# Patient Record
Sex: MALE | Race: WHITE | NOT HISPANIC OR LATINO | Employment: FULL TIME | URBAN - METROPOLITAN AREA
[De-identification: names, ages, dates, MRNs, and addresses within clinical notes are randomized per-mention and may not be internally consistent; named-entity substitution may affect disease eponyms.]

---

## 2022-05-17 ENCOUNTER — TELEPHONE (OUTPATIENT)
Dept: SURGERY | Facility: CLINIC | Age: 62
End: 2022-05-17
Payer: COMMERCIAL

## 2022-05-17 DIAGNOSIS — C20 RECTAL ADENOCARCINOMA: Primary | ICD-10-CM

## 2022-05-17 NOTE — TELEPHONE ENCOUNTER
Spoke with pt and wife. Colonoscopy done yesterday 5/16/22.  Pt has copy of colored photos.  Dr Prince ([facility]) referring to Dr Chavez for rectal mass.  Pathology report expected next week. They have yet to schedule labs (cbc, cmp, cea) and CT c-a-p.  Explained to pt that we may be needing MRI-rectal protocol and CD imaging.   Pt prefers to see Dr Chavez in [place] but ok to go to [facility] [place].  They will be requesting consult with Dr Delong.    Await callback from Dr Prince' office to discuss clinical data needed.    ----- Message -----  From: Brigitte Alvarez  Sent: 5/16/2022   2:36 PM CDT  To: Scott Rangel Staff    Type:Needs Medical Advice    Who Called:Norma (Wife)  Best call back number:PT number: [phone]  Wifes number: [phone]  Additional Info: Requesting a call back regarding#PT referred by Dr Prince. Mass was found on colonoscopy. Please call to schedule.  Please Advise- Thank you

## 2022-06-14 ENCOUNTER — HOSPITAL ENCOUNTER (OUTPATIENT)
Dept: RADIOLOGY | Facility: HOSPITAL | Age: 62
Discharge: HOME | End: 2022-06-14
Attending: COLON & RECTAL SURGERY
Payer: COMMERCIAL

## 2022-06-14 DIAGNOSIS — C20 RECTAL ADENOCARCINOMA: ICD-10-CM

## 2022-06-14 PROCEDURE — 72197 MRI PELVIS W/O & W/DYE: CPT | Mod: 26,,, | Performed by: RADIOLOGY

## 2022-06-14 PROCEDURE — A9585 GADOBUTROL INJECTION: HCPCS | Mod: PO | Performed by: COLON & RECTAL SURGERY

## 2022-06-14 PROCEDURE — 72197 MRI PELVIS W/O & W/DYE: CPT | Mod: TC,PO

## 2022-06-14 PROCEDURE — 25500020 PHARM REV CODE 255: Mod: PO | Performed by: COLON & RECTAL SURGERY

## 2022-06-14 PROCEDURE — 72197 MRI RECTAL CANCER W W/O CONTRAST: ICD-10-PCS | Mod: 26,,, | Performed by: RADIOLOGY

## 2022-06-14 RX ORDER — GADOBUTROL 604.72 MG/ML
8 INJECTION INTRAVENOUS
Status: COMPLETED | OUTPATIENT
Start: 2022-06-14 | End: 2022-06-14

## 2022-06-14 RX ADMIN — GADOBUTROL 8 ML: 604.72 INJECTION INTRAVENOUS at 04:06

## 2022-06-15 ENCOUNTER — OFFICE VISIT (OUTPATIENT)
Dept: SURGERY | Facility: CLINIC | Age: 62
End: 2022-06-15
Payer: COMMERCIAL

## 2022-06-15 VITALS
BODY MASS INDEX: 24.68 KG/M2 | WEIGHT: 172.38 LBS | HEART RATE: 77 BPM | SYSTOLIC BLOOD PRESSURE: 117 MMHG | HEIGHT: 70 IN | DIASTOLIC BLOOD PRESSURE: 56 MMHG

## 2022-06-15 DIAGNOSIS — C20 RECTAL CANCER: Primary | ICD-10-CM

## 2022-06-15 PROCEDURE — 99999 PR PBB SHADOW E&M-EST. PATIENT-LVL III: ICD-10-PCS | Mod: PBBFAC,,, | Performed by: COLON & RECTAL SURGERY

## 2022-06-15 PROCEDURE — 45330 PR SIGMOIDOSCOPY,DIAG2STIC: ICD-10-PCS | Mod: S$GLB,,, | Performed by: COLON & RECTAL SURGERY

## 2022-06-15 PROCEDURE — 3078F PR MOST RECENT DIASTOLIC BLOOD PRESSURE < 80 MM HG: ICD-10-PCS | Mod: CPTII,S$GLB,, | Performed by: COLON & RECTAL SURGERY

## 2022-06-15 PROCEDURE — 3008F PR BODY MASS INDEX (BMI) DOCUMENTED: ICD-10-PCS | Mod: CPTII,S$GLB,, | Performed by: COLON & RECTAL SURGERY

## 2022-06-15 PROCEDURE — 99999 PR PBB SHADOW E&M-EST. PATIENT-LVL III: CPT | Mod: PBBFAC,,, | Performed by: COLON & RECTAL SURGERY

## 2022-06-15 PROCEDURE — 3008F BODY MASS INDEX DOCD: CPT | Mod: CPTII,S$GLB,, | Performed by: COLON & RECTAL SURGERY

## 2022-06-15 PROCEDURE — 1159F MED LIST DOCD IN RCRD: CPT | Mod: CPTII,S$GLB,, | Performed by: COLON & RECTAL SURGERY

## 2022-06-15 PROCEDURE — 3078F DIAST BP <80 MM HG: CPT | Mod: CPTII,S$GLB,, | Performed by: COLON & RECTAL SURGERY

## 2022-06-15 PROCEDURE — 99205 PR OFFICE/OUTPT VISIT, NEW, LEVL V, 60-74 MIN: ICD-10-PCS | Mod: 25,S$GLB,, | Performed by: COLON & RECTAL SURGERY

## 2022-06-15 PROCEDURE — 3074F PR MOST RECENT SYSTOLIC BLOOD PRESSURE < 130 MM HG: ICD-10-PCS | Mod: CPTII,S$GLB,, | Performed by: COLON & RECTAL SURGERY

## 2022-06-15 PROCEDURE — 4010F ACE/ARB THERAPY RXD/TAKEN: CPT | Mod: CPTII,S$GLB,, | Performed by: COLON & RECTAL SURGERY

## 2022-06-15 PROCEDURE — 99205 OFFICE O/P NEW HI 60 MIN: CPT | Mod: 25,S$GLB,, | Performed by: COLON & RECTAL SURGERY

## 2022-06-15 PROCEDURE — 4010F PR ACE/ARB THEARPY RXD/TAKEN: ICD-10-PCS | Mod: CPTII,S$GLB,, | Performed by: COLON & RECTAL SURGERY

## 2022-06-15 PROCEDURE — 3074F SYST BP LT 130 MM HG: CPT | Mod: CPTII,S$GLB,, | Performed by: COLON & RECTAL SURGERY

## 2022-06-15 PROCEDURE — 1159F PR MEDICATION LIST DOCUMENTED IN MEDICAL RECORD: ICD-10-PCS | Mod: CPTII,S$GLB,, | Performed by: COLON & RECTAL SURGERY

## 2022-06-15 PROCEDURE — 45330 DIAGNOSTIC SIGMOIDOSCOPY: CPT | Mod: S$GLB,,, | Performed by: COLON & RECTAL SURGERY

## 2022-06-15 RX ORDER — SAW PALMETTO 160 MG
160 CAPSULE ORAL 2 TIMES DAILY
COMMUNITY

## 2022-06-15 RX ORDER — TADALAFIL 5 MG/1
5 TABLET ORAL NIGHTLY
COMMUNITY
Start: 2022-05-26

## 2022-06-15 RX ORDER — LEVOFLOXACIN 500 MG/1
750 TABLET, FILM COATED ORAL DAILY
Qty: 14 TABLET | Refills: 1 | Status: SHIPPED | OUTPATIENT
Start: 2022-06-15 | End: 2022-06-15 | Stop reason: CLARIF

## 2022-06-15 RX ORDER — PRAMIPEXOLE DIHYDROCHLORIDE 0.25 MG/1
0.25 TABLET ORAL NIGHTLY
COMMUNITY
Start: 2022-05-23

## 2022-06-15 RX ORDER — LISINOPRIL 10 MG/1
10 TABLET ORAL 2 TIMES DAILY
COMMUNITY
Start: 2022-05-23

## 2022-06-15 RX ORDER — MULTIVITAMIN
1 TABLET ORAL DAILY
COMMUNITY

## 2022-06-15 RX ORDER — AA/PROT/LYSINE/METHIO/VIT C/B6 50-12.5 MG
10 TABLET ORAL DAILY
COMMUNITY
End: 2022-09-07 | Stop reason: CLARIF

## 2022-06-15 RX ORDER — PRASTERONE (DHEA) 50 MG
50 CAPSULE ORAL DAILY
COMMUNITY

## 2022-06-15 RX ORDER — PNV NO.95/FERROUS FUM/FOLIC AC 28MG-0.8MG
100 TABLET ORAL DAILY
COMMUNITY

## 2022-06-15 NOTE — PROGRESS NOTES
CRS Office Visit History and Physical    Referring Md:   S[facility] Prince [facility]  [address] [place]  [place] [address]    SUBJECTIVE:     Chief Complaint: rectal CA    History of Present Illness:  Patient is a 61 y.o. male presents with rectal bleeding.     Last Colonoscopy:         MRI RECTAL CANCER W W/O CONTRAST     CLINICAL HISTORY:  Rectal cancer;  Malignant neoplasm of rectum     TECHNIQUE:  Multiplanar multi sequence images of the pelvis were obtained per rectal tumor protocol.     COMPARISON:  05/30/2022     FINDINGS:  COLONOSCOPY FINDINGS: No results available per chart review     MRI FINDINGS:     Tumor Location: Lower (0-5 cm), mid (5-10 cm)     Tumor location (distance from anal verge): 3.5 cm     Distance from top of anal canal (level of puborectalis at anorectal junction) to the inferior edge of the tumor: Tumor extends below the anorectal junction     Relationship to anterior peritoneal reflection: Straddles (Above and Below)     Tumor Characteristics:     Morphology: Annular     Circumferential extent/location: Tumor is centered in the mid rectum.  Lesion is circumferential and there is between partial to full-thickness involvement of the wall throughout the length.     Tumor Length: 6.7 cm     Mucinous: No     *MR-T CATEGORY: T3c (tumor penetrates > 5-15 mm beyond muscularis propria)     Maximum extramural depth of invasion can be seen series 4, image 15, measuring 6 mm.  There are spiculations extending into the mesorectal fat in multiple other locations.     LOW RECTAL TUMORS: The lower extent of the tumor is 1.5 cm below the upper border of the puborectalis sling.     If tumor is AT or BELOW the upper border of the puborectalis sling: The most penetrating component of the tumor invades: internal sphincter only.     *MESORECTAL FASCIA (MRF):     The distance of the most penetrating component of tumor to the mesorectal fascia is 15 mm on series 4, image 15.     *LYMPH  NODES:     Mesorectal/superior rectal lymph nodes): There are multiple prominent lymph nodes in the mesorectal fat, lower pericolonic fat, and adjacent to the left internal iliac artery, at least 20 in number.  At least 8 of these measure larger than 5 mm in short axis diameter.  A few reference nodes can be seen on axial series 4, image 6 (8 mm), axial series 4, image 10 (8 mm), and axial series 4, image 20 (9 mm).  The 9 mm node exhibits irregular borders.     *MR-N CATEGORY: Nx (all other cases): See above statement     Extra-mesorectal nodes (Int. Iliac/Obturator >= 7 mm): No internal iliac or obturator nodes greater than 7 mm     Non-locoregional nodes (Ext. Iliac/Inguinal > 1cm): No external iliac or inguinal nodes greater than 1 cm     *EXTRAMURAL VENOUS INVASION (EMVI): Negative. Stranding near extramural vessels, however vessels are normal caliber without definite tumor signal within.     Impression:     Rectal tumor consistent with known malignancy.     *MR STAGE     T: T3c (tumor penetrates > 5-15 mm beyond muscularis propria)     N: Nx (all other cases): See description above     Andria statement: Multiple lymph nodes in the mesorectal fat and lower pericolonic fat     *MRF: clear (tumor margin > 2 mm from MRF).  No clear involvement of the mesorectal fascia.  Along the caudal margin of the tumor anteriorly there is abutment of the prostate with complete effacement of the fat plane.     Sphincter Involvement: Tumor extends to the ano rectal junction at its caudal margin     Suspicious Extramesorectal Lymph Nodes: See above description     EMVI: No        Electronically signed by: Jeff Wilburn  Date:                                            06/14/2022  Time:                                           23:23    No past medical history on file.    No past surgical history on file.    Review of patient's allergies indicates:  No Known Allergies    Current Outpatient Medications on File Prior to Visit  "  Medication Sig Dispense Refill    lisinopriL 10 MG tablet Take 10 mg by mouth 2 (two) times daily.      pramipexole (MIRAPEX) 0.25 MG tablet TAKE 1 TABLET BY MOUTH EVERY DAY AT BEDTIME FOR 30 DAYS      tadalafiL (CIALIS) 5 MG tablet TAKE 1 TABLET BY MOUTH EVERY DAY FOR 90 DAYS       Current Facility-Administered Medications on File Prior to Visit   Medication Dose Route Frequency Provider Last Rate Last Admin    [COMPLETED] gadobutroL (GADAVIST) injection 8 mL  8 mL Intravenous ONCE PRN H. Juan Carlos Chavez MD   8 mL at 06/14/22 1606       No family history on file.          Review of Systems:  ROS:  GENERAL: No fever, chills, fatigability or weight loss.  Integument:  No rashes, redness, icterus  CHEST: Denies AQUINO, cyanosis, wheezing, cough and sputum production.  CARDIOVASCULAR: Denies chest pain, PND, orthopnea or reduced exercise tolerance.  GI:  Denies abd pain, dysphagia, nausea, vomiting, no hematemesis, no rectal pain  : Denies burning on urination, no hematuria, no bacteriuria  MSK:  No deformities, swelling, joint pain swelling  Neurologic:  No HAs, seizures, weakness, paresthesias, gait problems      OBJECTIVE:     Vital Signs (Most Recent)  BP (!) 117/56 (BP Location: Left arm, Patient Position: Sitting, BP Method: Large (Automatic))   Pulse 77   Ht 5' 10" (1.778 m)   Wt 78.2 kg (172 lb 6.4 oz)   BMI 24.74 kg/m²     Physical Exam:  General: White male in no distress   Skin/ Sclera anicteric  LN none palpable  HEENT: anicteric, normocephalic, extraocular movements intact   Neck trachea midline, thyroid not enlarged  Chest symmetric, nl excursions, no retractions  Respiratory: respirations are even and unlabored  COR RRR   Abdomen - inspection   soft NT ND.  no masses, no organomegaly    Anorectal:   Inspectio  Extremities: Warm dry and intact.  NO CCE  Neuro: alert and oriented x 4.  Moves all extremities.         ASSESSMENT/PLAN:   Locally invasive rectal cancer, low rectum, T3 N1 by MRI.  MMR " proficient  No distant disease  Good performance status (ECOG 0)    Recommend  See flexible sigmoidoscopy.    Total neoadjuvant therapy, long course XRT followed by 8  cycles of FOLFOX or CAPOX   Restaging MRI and flex sigmoidoscopy following last chemoRX

## 2022-06-15 NOTE — PROVATION PATIENT INSTRUCTIONS
Discharge Summary/Instructions after an Endoscopic Procedure  Patient Name: Hipolito Casillas  Patient MRN: [id number]  Patient YOB: 1960  Wednesday, Bonita 15, 2022  Juan Carlos Chavez MD  Dear patient,  As a result of recent federal legislation (The Federal Cures Act), you may   receive lab or pathology results from your procedure in your [facility]   account before your physician is able to contact you. Your physician or   their representative will relay the results to you with their   recommendations at their soonest availability.  Thank you,  RESTRICTIONS:  During your procedure today, you received medications for sedation.  These   medications may affect your judgment, balance and coordination.  Therefore,   for 24 hours, you have the following restrictions:   - DO NOT drive a car, operate machinery, make legal/financial decisions,   sign important papers or drink alcohol.    ACTIVITY:  Today: no heavy lifting, straining or running due to procedural   sedation/anesthesia.  The following day: return to full activity including work.  DIET:  Eat and drink normally unless instructed otherwise.     TREATMENT FOR COMMON SIDE EFFECTS:  - Mild abdominal pain, nausea, belching, bloating or excessive gas:  rest,   eat lightly and use a heating pad.  - Sore Throat: treat with throat lozenges and/or gargle with warm salt   water.  - Because air was used during the procedure, expelling large amounts of air   from your rectum or belching is normal.  - If a bowel prep was taken, you may not have a bowel movement for 1-3 days.    This is normal.  SYMPTOMS TO WATCH FOR AND REPORT TO YOUR PHYSICIAN:  1. Abdominal pain or bloating, other than gas cramps.  2. Chest pain.  3. Back pain.  4. Signs of infection such as: chills or fever occurring within 24 hours   after the procedure.  5. Rectal bleeding, which would show as bright red, maroon, or black stools.   (A tablespoon of blood from the rectum is not serious, especially if    hemorrhoids are present.)  6. Vomiting.  7. Weakness or dizziness.  GO DIRECTLY TO THE NEAREST EMERGENCY ROOM IF YOU HAVE ANY OF THE FOLLOWING:      Difficulty breathing              Chills and/or fever over 101 F   Persistent vomiting and/or vomiting blood   Severe abdominal pain   Severe chest pain   Black, tarry stools   Bleeding- more than one tablespoon   Any other symptom or condition that you feel may need urgent attention  Your doctor recommends these additional instructions:  If any biopsies were taken, your doctors clinic will contact you in 1 to 2   weeks with any results.  - Discharge patient to home (ambulatory).   - Patient has a contact number available for emergencies.  The signs and   symptoms of potential delayed complications were discussed with the   patient.  Return to normal activities tomorrow.  Written discharge   instructions were provided to the patient.   - Resume previous diet.   - Repeat flexible sigmoidoscopy in 6 months for surveillance.  For questions, problems or results please call your physician - Juan Carlos Chavez MD at Work:  [phone].  [facility], EMERGENCY ROOM PHONE NUMBER: [phone]  IF A COMPLICATION OR EMERGENCY SITUATION ARISES AND YOU ARE UNABLE TO REACH   YOUR PHYSICIAN - GO DIRECTLY TO THE EMERGENCY ROOM.  Juan Carlos Chavez MD  6/15/2022 5:16:56 PM  This report has been verified and signed electronically.  Dear patient,  As a result of recent federal legislation (The Federal Cures Act), you may   receive lab or pathology results from your procedure in your [facility]   account before your physician is able to contact you. Your physician or   their representative will relay the results to you with their   recommendations at their soonest availability.  Thank you,  PROVATION

## 2022-06-20 ENCOUNTER — DOCUMENTATION ONLY (OUTPATIENT)
Dept: ADMINISTRATIVE | Facility: OTHER | Age: 62
End: 2022-06-20
Payer: COMMERCIAL

## 2022-06-21 ENCOUNTER — HOSPITAL ENCOUNTER (OUTPATIENT)
Dept: RADIOLOGY | Facility: HOSPITAL | Age: 62
Discharge: HOME | End: 2022-06-21
Attending: INTERNAL MEDICINE
Payer: COMMERCIAL

## 2022-06-21 DIAGNOSIS — C20 RECTAL CANCER: ICD-10-CM

## 2022-06-21 PROCEDURE — 74183 MRI ABD W/O CNTR FLWD CNTR: CPT | Mod: TC,PO

## 2022-06-21 PROCEDURE — A9577 INJ MULTIHANCE: HCPCS | Mod: PO | Performed by: INTERNAL MEDICINE

## 2022-06-21 PROCEDURE — 74183 MRI ABD W/O CNTR FLWD CNTR: CPT | Mod: 26,,, | Performed by: RADIOLOGY

## 2022-06-21 PROCEDURE — 25500020 PHARM REV CODE 255: Mod: PO | Performed by: INTERNAL MEDICINE

## 2022-06-21 PROCEDURE — 74183 MRI ABDOMEN W WO CONTRAST: ICD-10-PCS | Mod: 26,,, | Performed by: RADIOLOGY

## 2022-06-21 RX ADMIN — GADOBENATE DIMEGLUMINE 17 ML: 529 INJECTION, SOLUTION INTRAVENOUS at 11:06

## 2022-06-27 ENCOUNTER — OFFICE VISIT (OUTPATIENT)
Dept: SURGERY | Facility: CLINIC | Age: 62
End: 2022-06-27
Payer: COMMERCIAL

## 2022-06-27 VITALS
SYSTOLIC BLOOD PRESSURE: 119 MMHG | HEIGHT: 70 IN | WEIGHT: 180.56 LBS | DIASTOLIC BLOOD PRESSURE: 54 MMHG | HEART RATE: 88 BPM | BODY MASS INDEX: 25.85 KG/M2

## 2022-06-27 DIAGNOSIS — C20 RECTAL CANCER: Primary | ICD-10-CM

## 2022-06-27 PROCEDURE — 99999 PR PBB SHADOW E&M-EST. PATIENT-LVL III: CPT | Mod: PBBFAC,,, | Performed by: COLON & RECTAL SURGERY

## 2022-06-27 PROCEDURE — 99214 OFFICE O/P EST MOD 30 MIN: CPT | Mod: S$GLB,,, | Performed by: COLON & RECTAL SURGERY

## 2022-06-27 PROCEDURE — 3074F SYST BP LT 130 MM HG: CPT | Mod: CPTII,S$GLB,, | Performed by: COLON & RECTAL SURGERY

## 2022-06-27 PROCEDURE — 4010F PR ACE/ARB THEARPY RXD/TAKEN: ICD-10-PCS | Mod: CPTII,S$GLB,, | Performed by: COLON & RECTAL SURGERY

## 2022-06-27 PROCEDURE — 3008F BODY MASS INDEX DOCD: CPT | Mod: CPTII,S$GLB,, | Performed by: COLON & RECTAL SURGERY

## 2022-06-27 PROCEDURE — 99999 PR PBB SHADOW E&M-EST. PATIENT-LVL III: ICD-10-PCS | Mod: PBBFAC,,, | Performed by: COLON & RECTAL SURGERY

## 2022-06-27 PROCEDURE — 1159F PR MEDICATION LIST DOCUMENTED IN MEDICAL RECORD: ICD-10-PCS | Mod: CPTII,S$GLB,, | Performed by: COLON & RECTAL SURGERY

## 2022-06-27 PROCEDURE — 99214 PR OFFICE/OUTPT VISIT, EST, LEVL IV, 30-39 MIN: ICD-10-PCS | Mod: S$GLB,,, | Performed by: COLON & RECTAL SURGERY

## 2022-06-27 PROCEDURE — 3078F PR MOST RECENT DIASTOLIC BLOOD PRESSURE < 80 MM HG: ICD-10-PCS | Mod: CPTII,S$GLB,, | Performed by: COLON & RECTAL SURGERY

## 2022-06-27 PROCEDURE — 3008F PR BODY MASS INDEX (BMI) DOCUMENTED: ICD-10-PCS | Mod: CPTII,S$GLB,, | Performed by: COLON & RECTAL SURGERY

## 2022-06-27 PROCEDURE — 3078F DIAST BP <80 MM HG: CPT | Mod: CPTII,S$GLB,, | Performed by: COLON & RECTAL SURGERY

## 2022-06-27 PROCEDURE — 1159F MED LIST DOCD IN RCRD: CPT | Mod: CPTII,S$GLB,, | Performed by: COLON & RECTAL SURGERY

## 2022-06-27 PROCEDURE — 3074F PR MOST RECENT SYSTOLIC BLOOD PRESSURE < 130 MM HG: ICD-10-PCS | Mod: CPTII,S$GLB,, | Performed by: COLON & RECTAL SURGERY

## 2022-06-27 PROCEDURE — 4010F ACE/ARB THERAPY RXD/TAKEN: CPT | Mod: CPTII,S$GLB,, | Performed by: COLON & RECTAL SURGERY

## 2022-06-28 NOTE — PROGRESS NOTES
RADIATION ONCOLOGY CONSULTATION  [facility]        NAME: Hipolito Casillas   : 1960 SEX: CORRINE MR#: [id number]   DIAGNOSIS: Rectal cancer.   REFERRING PHYSICIAN: Ty Delong M.D.   DATE OF CONSULTATION: 2022       IDENTIFICATION:  The patient is a 61-year-old male who presents with a newly diagnosed stage IIIB, [id number] moderately differentiated rectal adenocarcinoma, status post colonoscopy and biopsy.  The patient is being seen in consultation for consideration of radiotherapy at the request of Dr. Delong.    HISTORY OF PRESENT ILLNESS:  The patient reports that he first began to experience bright-red blood per rectum and increased constipation in 2021.  He saw his primary care physician, at which time a Cologuard was performed and reportedly positive.  The patient states that the workup for the hematochezia was delayed secondary to COVID-19 having delayed elective procedures.  He does report the bleeding and constipation becoming progressively worse with the patient self-medicating with multiple topicals for hemorrhoids without effect.     He saw Nurse Practitioner Leda on 05/10/2022.  It was noted that it was felt that he was likely presenting with hemorrhoids, and the recommendations were made for colonoscopy and to start fiber.  It does not appear that a digital rectal examination was performed at that time.     He saw Dr. Prince of Gastroenterology on 2022, at which time he performed the colonoscopy.  On digital rectal examination, there was a circumferential firm rectal mass with the distance from the verge not noted.  On endoscopy, there was an infiltrating, nonobstructing large mass in the [id number] rectum with oozing, 5 cm in length, with no mention made of the distance of the tumor from the anal verge. The tumor was described both as circumferential and noncircumferential on the procedure note.  The tumor was biopsied and tattooed, and found to be  positive for moderately differentiated adenocarcinoma.  There was a 35 mm pedunculated sigmoid colon polyp, 10 mm sigmoid polyp which was sessile and 25 mm sigmoid pedunculated polyp, all of which were resected and positive for tubulovillous adenoma.  There were diverticula noted in the sigmoid colon and descending colon.  The tumor was found to be MLH1, MSH2, MSH6 and PMS2 on tagged.     CEA on 05/30/2022, was 29.2.  CT of the chest, abdomen and pelvis with contrast on 05/30/2022, demonstrated irregular rectal wall thickening with multiple rounded subcentimeter lymph nodes and colonic diverticulosis.  There were small bilateral fat-containing inguinal hernias.  There was a 3 mm right hepatic lobe hypodensity felt to likely represent a cyst or hamartoma.  There were small thyroid nodules up to 11 mm and no clear evidence of distant metastatic disease.     He saw Dr. Delong of Medical Oncology on 06/06/2022, who recommended a PET scan, DPD testing and iron studies.  The patient was referred to Radiation Oncology and encouraged to follow up with Surgery.  DPD testing was performed which was negative.  MRI of the rectum on 06/14/2021, showed a 6.7 cm circumferential midrectal tumor starting at approximately 3.5 cm from the anal verge, the anorectal junction and its strata of the peritoneal reflection.  There was extramural extension up to 6 mm with spiculations in the mesorectal fat. There were multiple prominent lymph nodes noted in the mesorectal pericolonic fat and adjacent to the left internal iliac artery, at least 20 in number, at least eight being greater than 5 mm.  There was no external iliac or inguinal lymphadenopathy.  There was no EMVI.  The mesorectal fascia was clear with greater than 2 mm margin. The patient was staged as [id number](?).     He saw Dr. Chavez of Surgery on 06/15/2021, which is currently unavailable.  The patient reports there was a discussion about possible treatment with immunotherapy.  He  underwent digital rectal examination.  There was a noncircumferential, firm, fixed nodular rectal mass 3.4 cm from the verge, predominantly involving the posterior wall.  On the sigmoidoscopy, there was a circumferential, oozing, fungating, infiltrating and ulcerated nonobstructing mass in the distal rectum 8 cm in length.     The patient is scheduled for PET-CT delineation tomorrow, on 06/21/2022, and he is also scheduled to undergo an MRI of the abdomen.    REVIEW OF SYSTEMS:  The patient continues to note daily bright-red blood per rectum with bowel movements.  He also reports loose stools, alternating with constipation, and increased rectal urgency and pressure.  He denies any abdominal pain, nausea, vomiting or urinary symptoms and notes occasional GI upset.     He denies any high fevers, shaking chills or drenching night sweats, but does report a 40-pound unintentional weight loss over the past year which he reports is secondary to decreased p.o. intake intentionally made to minimize his GI symptoms.  He denies any recent change in vision, hearing or swallowing, shortness of breath, dyspnea on exertion, cough or chest pain.  He denies any severe headaches, diplopia, ataxia or focal numbness, tingling or weakness.  All systems reviewed and negative.    PAST MEDICAL HISTORY:  Rectal cancer as above, diabetes mellitus, hypertension.    PAST SURGICAL HISTORY:  Status post tonsillectomy in the 1960s, status post appendectomy in 1970.    PAST RADIATION THERAPY:  None.    MEDICATIONS:  Curcumin, ginseng, glucosamine/chondroitin, Lantus, lisinopril, tadalafil, vitamin D3, MSM, magnesium, DHEA, saw palmetto, elderberry, vitamin B12.    ALLERGIES:  No known drug allergies.    FAMILY HISTORY:  Father with colon cancer diagnosed at age of 62.  Son with sinus and renal cell carcinoma.  Brother with brain tumor.    SOCIAL HISTORY:  The patient does not smoke, drink alcohol and denies illicit drug use.  He lives in [facility]  is , and has one son and one daughter.  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    PHYSICAL EXAMINATION:  VITAL SIGNS:  Blood pressure 113/74, temperature 97.7, heart rate 81, weight is 172 pounds, height 5 feet 9 inches, oxygen saturation 98% on room air.  ECOG score 0-1.   GENERAL:  The patient is a pleasant well-nourished, well-developed  male in no acute distress.   HEENT:  Normocephalic, atraumatic.  Extraocular muscles intact.  Pupils equally round and reactive to light.  Sclerae are anicteric.  Oral cavity and oropharynx are clear without erythema, exudate, masses or ulcerations.   NECK:  Supple without lymphadenopathy or thyromegaly.   HEART:  Regular rate and rhythm.  Normal S1, S2.  No murmurs, gallops or rubs.   LUNGS:  Clear to auscultation bilaterally.  No wheezes, rhonchi or rales.   BACK:  No spinal or costovertebral angle tenderness.   ABDOMEN:  Soft, nontender and nondistended.  No masses or hepatosplenomegaly.   EXTREMITIES:  Warm and well perfused. No clubbing, cyanosis, or edema.   NEUROLOGIC:  Cranial nerves two through 12 grossly intact.  Motor and sensory intact bilaterally.  Finger-nose-finger and gait within normal limits.  1- patellar deep tendon reflexes bilaterally.  No clonus.   GROINS:  No inguinal lymphadenopathy bilaterally.   DIGITAL RECTAL:  There is a firm, fixed, fungating circumferential tumor which begins approximately 3.5 cm from the anal verge with the proximal extent of the tumor not able to be palpated by the examining finger.  It appears to predominantly involve the anterior and right rectal wall.  Good sphincter tone.    LABORATORIES:  On 05/30/2022, white blood cells 7.89, hemoglobin 9.8, hematocrit 32.2, platelets 325.  Sodium 138, potassium 5.0, chloride 105, CO2 of 28, BUN 20, creatinine 0.97, glucose 20, AST 21, ALT 14, total bilirubin 0.3, alkaline phosphatase 42.    ASSESSMENT AND PLAN:  The  patient is a 61-year-old male who presents with an at least stage IIIB, [id number] moderately differentiated rectal adenocarcinoma, status post colonoscopy and biopsy.     Based upon this patient's history and presentation, I believe that he is likely an appropriate candidate for external beam radiation therapy.  My first recommendation is for completion of staging consisting of a PET scan to further establish the extent of regional disease, as well as to rule out distant metastases.  I plan to obtain the imaging in the prone treatment position so it might be utilized, not only for diagnostic purposes, but also for tumor targeting during anticipated radiation treatment planning.  In addition, an MRI of the abdomen has been ordered by Dr. Delong to further characterize the small hepatic hypodensity which I think is very unlikely to represent disease, as it was felt to be consistent with a benign cyst or hamartoma on the CT imaging.     I explained to the patient that he is clearly presenting with locally advanced disease with tumor extension through the rectal wall on MRI imaging, consistent with T3 disease.  Interestingly, there is no mention made of the clinical trino stage on the MRI of the rectum; however, I feel that given the increased number and size of the perirectal lymph nodes that they most likely represent trino disease, to hopefully be better characterized on the PET imaging.  I explained to him to be presenting with at least T3N1 disease, possibly T3N2.     We discussed that the primary curative treatment modality in this disease is surgical resection and he has already been evaluated by Dr. Chavez.  The patient is presenting with a relatively low rectal tumor and is therefore unclear whether or not he can avoid a permanent colostomy, which may be possible should he demonstrate excellent clinical response to preoperative treatment, which I encouraged him to discuss with Dr. Chavez.  I explained to the  patient that given his locally advanced presentation, that he will require operative treatment as local control and that there are a number of different approaches which would be considered reasonable.     In Mr. Casillas's particular case, given the low rectal tumor, my preference would be for a standard course of external beam radiation therapy over approximately five and half weeks with concurrent chemotherapy, as opposed to a hypofractionated short course of external beam radiation therapy alone given the location of the tumor, as well as the increased possibility of a sphincter-sparing surgery ultimately.  Consideration may be given to possible treatment of the inguinal nodes given the relatively low location of the tumor.      The patient is aware that he will likely require chemotherapy alone which may be delivered in the preoperative setting as total neoadjuvant therapy or in the adjuvant setting, which I will defer to Dr. Chavez and Dr. Delong.  Of note, the patient lives in [place] and have therefore offered him treatment at the [facility], however, he prefers treatment in [place], which I am happy to arrange.     The risks, benefits, side effects, alternatives to, indications for and mechanisms of external beam radiation therapy were explained in full to the patient.  He and his wife who accompanied him at today's visit asked multiple appropriate questions, all which were answered to their apparent satisfaction.  This conversation included discussion of possible short-term side effects, including but not limited to loose stools, diarrhea, urinary urgency and/or frequency, abdominal cramping, hip discomfort, dermatitis, including perianal irritation and fatigue.  We also discussed possible long-term side effects, including but not limited to low risk of bladder injury and/or small bowel obstruction, risk of damage to the hips and/or nerves, increased risk of erectile  dysfunction over several years and very low risk of secondary malignancy formation over several decades.  The patient agreed with the proposed treatment plan and informed consent was obtained.     I will continue to follow Mr. Casillas as he undergoes PET-CT guided simulation in the prone treatment position with the use of a belly board for custom immobilization tomorrow, as well as MRI of the abdomen.  After the results of the studies are available, I plan to discuss his case with both Dr. Delong and Dr. Chavez, and after a multidisciplinary decision has been made anticipate proceeding with radiation treatment planning.  After a customized treatment plan has been designed, he would undergo verification films and initiate a course of preoperative radiotherapy to the primary tumor and regional lymph nodes, to be coordinated with concurrent systemic therapy.     Thank you very much, Dr. Delong, for this consultation and the opportunity to participate in the care of this patient.  Please do not hesitate to contact me should you have any questions, concerns and/or require additional information.       ADDENDUM #1:  PET-CT scan on 06/21/2022, demonstrated a circumferential rectal wall mass with an SUV of 22.6, with the length not noted.  There was a rounded 0.8 cm lymph node adjacent to the rectum anteriorly with an increased SUV of 4.9, as well as additional smaller rounded perirectal lymph nodes scattered throughout the pelvis and below the resolution of PET scan. There was no clear evidence of distant metastatic disease.    ADDENDUM #2: MRI of the abdomen on 06/21/2022, demonstrated the liver lesion noted on CT scan to be unchanged in size, measuring approximately 5 mm, suggestive of a simple cyst.  In other words, no evidence of suspicious hepatic lesions. There was a right renal cyst noted, measuring 8 mm.        FENG Hall MD

## 2022-07-03 PROBLEM — C20 RECTAL CANCER: Status: ACTIVE | Noted: 2022-07-03

## 2022-07-03 NOTE — PROGRESS NOTES
HPI:  Hipolito Casillas is a 61 y.o. male with history of rectal CA    EXAMINATION:  MRI RECTAL CANCER W W/O CONTRAST     CLINICAL HISTORY:  Rectal cancer;  Malignant neoplasm of rectum     TECHNIQUE:  Multiplanar multi sequence images of the pelvis were obtained per rectal tumor protocol.     COMPARISON:  05/30/2022     FINDINGS:  COLONOSCOPY FINDINGS: No results available per chart review     MRI FINDINGS:     Tumor Location: Lower (0-5 cm), mid (5-10 cm)     Tumor location (distance from anal verge): 3.5 cm     Distance from top of anal canal (level of puborectalis at anorectal junction) to the inferior edge of the tumor: Tumor extends below the anorectal junction     Relationship to anterior peritoneal reflection: Straddles (Above and Below)     Tumor Characteristics:     Morphology: Annular     Circumferential extent/location: Tumor is centered in the mid rectum.  Lesion is circumferential and there is between partial to full-thickness involvement of the wall throughout the length.     Tumor Length: 6.7 cm     Mucinous: No     *MR-T CATEGORY: T3c (tumor penetrates > 5-15 mm beyond muscularis propria)     Maximum extramural depth of invasion can be seen series 4, image 15, measuring 6 mm.  There are spiculations extending into the mesorectal fat in multiple other locations.     LOW RECTAL TUMORS: The lower extent of the tumor is 1.5 cm below the upper border of the puborectalis sling.     If tumor is AT or BELOW the upper border of the puborectalis sling: The most penetrating component of the tumor invades: internal sphincter only.     *MESORECTAL FASCIA (MRF):     The distance of the most penetrating component of tumor to the mesorectal fascia is 15 mm on series 4, image 15.     *LYMPH NODES:     Mesorectal/superior rectal lymph nodes): There are multiple prominent lymph nodes in the mesorectal fat, lower pericolonic fat, and adjacent to the left internal iliac artery, at least 20 in number.  At least 8 of these  measure larger than 5 mm in short axis diameter.  A few reference nodes can be seen on axial series 4, image 6 (8 mm), axial series 4, image 10 (8 mm), and axial series 4, image 20 (9 mm).  The 9 mm node exhibits irregular borders.     *MR-N CATEGORY: Nx (all other cases): See above statement     Extra-mesorectal nodes (Int. Iliac/Obturator >= 7 mm): No internal iliac or obturator nodes greater than 7 mm     Non-locoregional nodes (Ext. Iliac/Inguinal > 1cm): No external iliac or inguinal nodes greater than 1 cm     *EXTRAMURAL VENOUS INVASION (EMVI): Negative. Stranding near extramural vessels, however vessels are normal caliber without definite tumor signal within.     Impression:     Rectal tumor consistent with known malignancy.     *MR STAGE     T: T3c (tumor penetrates > 5-15 mm beyond muscularis propria)     N: Nx (all other cases): See description above     Andria statement: Multiple lymph nodes in the mesorectal fat and lower pericolonic fat     *MRF: clear (tumor margin > 2 mm from MRF).  No clear involvement of the mesorectal fascia.  Along the caudal margin of the tumor anteriorly there is abutment of the prostate with complete effacement of the fat plane.     Sphincter Involvement: Tumor extends to the ano rectal junction at its caudal margin     Suspicious Extramesorectal Lymph Nodes: See above description     EMVI: No        Electronically signed by: Jeff Wilburn  Date:                                            06/14/2022  Time:                                           23:23       No past medical history on file.     No past surgical history on file.    Review of patient's allergies indicates:  No Known Allergies    No family history on file.    Social History     Socioeconomic History    Marital status:        ROS:  GENERAL: No fever, chills, fatigability or weight loss.  Integument: No rashes, redness, icterus  CHEST: Denies AQUINO, cyanosis, wheezing, cough and sputum  "production.  CARDIOVASCULAR: Denies chest pain, PND, orthopnea or reduced exercise tolerance.  GI: Denies abd pain, dysphagia, nausea, vomiting, no hematemesis   : Denies burning on urination, no hematuria, no bacteriuria  MSK: No deformities, swelling, joint pain swelling  Neurologic: No HAs, seizures, weakness, paresthesias, gait problems    PE:  General appearance well  BP (!) 119/54 (BP Location: Right arm, Patient Position: Sitting, BP Method: Large (Automatic))   Pulse 88   Ht 5' 10" (1.778 m)   Wt 81.9 kg (180 lb 8.9 oz)   BMI 25.91 kg/m²   Sclera/ Skin anicteric  LN none palpable  AT NC EOMI  Neck supple trachea midline   Chest symmetric, nl excursion, no retractions, breathing comfortably  Abdomen  ND soft NT.  no masses, no organomegaly  EXT - no CCE  Neuro:  Mood/ affect nl, alert and oriented x 3, moves all ext's, gait nl      Assessment:  Rectal cancer  MMR proficient, T3 N1 by MRI  No distant mets though one 3 mm liver lesion seen      Plan:  PET scan  Proceed with RICHARD - long course CXRT due to low tumor, anal canal followed by systemic chemoRX for 8 weeks  Restaging MRI and flex sigmoidoscopy after chemoRX  RTC 6 months        "

## 2022-12-28 ENCOUNTER — TELEPHONE (OUTPATIENT)
Dept: SURGERY | Facility: CLINIC | Age: 62
End: 2022-12-28
Payer: COMMERCIAL

## 2022-12-28 NOTE — TELEPHONE ENCOUNTER
----- Message from Angelica Guerrier sent at 12/28/2022 11:36 AM CST -----  Regarding: appt  Contact: pt [phone]  Pt is calling in regards to his appt that is schedule on 1/4, pt would like to know if he need to come to this appt or could it be reschedule it because said that 1/4 is his IV day. Please call